# Patient Record
Sex: MALE | Race: OTHER | HISPANIC OR LATINO | URBAN - METROPOLITAN AREA
[De-identification: names, ages, dates, MRNs, and addresses within clinical notes are randomized per-mention and may not be internally consistent; named-entity substitution may affect disease eponyms.]

---

## 2020-03-09 ENCOUNTER — EMERGENCY (EMERGENCY)
Facility: HOSPITAL | Age: 35
LOS: 1 days | Discharge: HOME | End: 2020-03-09
Attending: EMERGENCY MEDICINE | Admitting: EMERGENCY MEDICINE
Payer: MEDICAID

## 2020-03-09 VITALS
SYSTOLIC BLOOD PRESSURE: 132 MMHG | TEMPERATURE: 100 F | OXYGEN SATURATION: 99 % | RESPIRATION RATE: 16 BRPM | WEIGHT: 199.96 LBS | DIASTOLIC BLOOD PRESSURE: 87 MMHG | HEIGHT: 66 IN | HEART RATE: 62 BPM

## 2020-03-09 VITALS
OXYGEN SATURATION: 98 % | HEART RATE: 64 BPM | DIASTOLIC BLOOD PRESSURE: 77 MMHG | RESPIRATION RATE: 16 BRPM | SYSTOLIC BLOOD PRESSURE: 128 MMHG | TEMPERATURE: 98 F

## 2020-03-09 PROCEDURE — 99283 EMERGENCY DEPT VISIT LOW MDM: CPT

## 2020-03-09 PROCEDURE — 99283 EMERGENCY DEPT VISIT LOW MDM: CPT | Mod: 25

## 2020-03-09 PROCEDURE — 90471 IMMUNIZATION ADMIN: CPT

## 2020-03-09 PROCEDURE — 90715 TDAP VACCINE 7 YRS/> IM: CPT

## 2020-03-09 RX ORDER — CIPROFLOXACIN HCL 0.3 %
2 DROPS OPHTHALMIC (EYE)
Qty: 1 | Refills: 0
Start: 2020-03-09 | End: 2020-03-15

## 2020-03-09 RX ORDER — FLUORESCEIN SODIUM 9 MG
1 STRIP OPHTHALMIC (EYE) ONCE
Refills: 0 | Status: COMPLETED | OUTPATIENT
Start: 2020-03-09 | End: 2020-03-09

## 2020-03-09 RX ORDER — TETANUS TOXOID, REDUCED DIPHTHERIA TOXOID AND ACELLULAR PERTUSSIS VACCINE, ADSORBED 5; 2.5; 8; 8; 2.5 [IU]/.5ML; [IU]/.5ML; UG/.5ML; UG/.5ML; UG/.5ML
0.5 SUSPENSION INTRAMUSCULAR ONCE
Refills: 0 | Status: COMPLETED | OUTPATIENT
Start: 2020-03-09 | End: 2020-03-09

## 2020-03-09 RX ADMIN — Medication 1 APPLICATION(S): at 10:41

## 2020-03-09 RX ADMIN — Medication 1 DROP(S): at 10:42

## 2020-03-09 RX ADMIN — TETANUS TOXOID, REDUCED DIPHTHERIA TOXOID AND ACELLULAR PERTUSSIS VACCINE, ADSORBED 0.5 MILLILITER(S): 5; 2.5; 8; 8; 2.5 SUSPENSION INTRAMUSCULAR at 12:24

## 2020-03-09 NOTE — ED PROVIDER NOTE - CLINICAL SUMMARY MEDICAL DECISION MAKING FREE TEXT BOX
33 yo M  with FB to right eye. Appreciable circular FB at 8:00 position. Partially removed with 18G needle and cotton swab. IOP pressure WNL (11, 15,16). Corneal abrasion presents. Cipro gtts provided and optho referrals made. Nontoxic and medically stable for discharge. Return precautions provided and patient understands to return to the ED for worsening signs and symptoms. Instructed to follow up with optho ASAP and agreeable. tetanus updated. Patient's questions answered.

## 2020-03-09 NOTE — ED PROVIDER NOTE - NSFOLLOWUPCLINICS_GEN_ALL_ED_FT
[facility] - Ophthalmology Clinic  Ophthalmology  [address], [address] [place] [address]  Phone: [phone]  Fax:   Follow Up Time:     [facility]  Ophthalmology  [address] [place] [address]  Phone: [phone]  Fax:   Follow Up Time:     [facility] - Ophthalmology  Ophthalmology  [address], [address]  [place] [address]  Phone: [phone]  Fax:   Follow Up Time:     [facility]  Ophthalmology  [address], [address]  [place] [address]  Phone: [phone]  Fax:   Follow Up Time:     [facility]  Ophthalmolgy  [address], [address]  [place] [address]  Phone: [phone]  Fax:   Follow Up Time:

## 2020-03-09 NOTE — ED PROVIDER NOTE - CONSTITUTIONAL, MLM
normal... Well appearing, awake, alert, oriented to person, place, time/situation and in no apparent distress. wake, alert, oriented to person, place, time/situation and in no apparent distress.

## 2020-03-09 NOTE — ED PROVIDER NOTE - BILATERAL EYES
circular black foreign body at 8'o clock position in iris. conjunctiva mildly injected. extraocular movement intact. pupils reactive. circular small black foreign body at 8'o clock position in iris. conjunctiva mildly injected. extraocular movement intact. pupils reactive.

## 2020-03-09 NOTE — ED PROVIDER NOTE - PATIENT PORTAL LINK FT
You can access the FollowMyHealth Patient Portal offered by [facility] by registering at the following website: [email or web address]. By joining [employer]’s FollowMyHealth portal, you will also be able to view your health information using other applications (apps) compatible with our system.

## 2020-03-09 NOTE — ED PROVIDER NOTE - OBJECTIVE STATEMENT
35 y/o  M who works at [employer] presents to ED complaining of foreign eye body. Pt states 3d ago, he was working on a ceiling and felt some particles go into his right eye. Pt adds there is increased irritation and he feels like something in there so he is rubbing his eye a lot. Pt states there is pain with extraocular movements. Pt denies using protective goggles, glasses, or contacts. Pt denies visual changes, dizziness or any other complaints. NKDA. 33 y/o  M who works at [employer] presents to ED complaining of foreign eye body. Pt states 3d ago, he was working on a ceiling and felt some particles go into his right eye. Pt adds there is increased irritation and he feels like something in there so he is rubbing his eye a lot. Pt states there is no pain with extraocular movements. Pt denies using protective goggles, glasses, or contacts. Pt denies visual changes, dizziness or any other complaints. NKDA.

## 2020-03-09 NOTE — ED PROVIDER NOTE - NSFOLLOWUPINSTRUCTIONS_ED_ALL_ED_FT
You were seen today for your eye pain. You were found to have a foreign body in your eye. Please follow up at the eye clinic and use your eye drops as prescribed. Please return to the Emergency Department for worsening signs or symptoms.

## 2020-03-09 NOTE — ED ADULT NURSE NOTE - NSIMPLEMENTINTERV_GEN_ALL_ED
Implemented All Universal Safety Interventions:  [place] to call system. Call bell, personal items and telephone within reach. Instruct patient to call for assistance. Room bathroom lighting operational. Non-slip footwear when patient is off stretcher. Physically safe environment: no spills, clutter or unnecessary equipment. Stretcher in lowest position, wheels locked, appropriate side rails in place.

## 2020-03-09 NOTE — ED PROVIDER NOTE - CARE PLAN
Principal Discharge DX:	Foreign body of right eye, initial encounter Principal Discharge DX:	Foreign body of right eye, initial encounter  Secondary Diagnosis:	Corneal abrasion

## 2020-03-09 NOTE — ED PROVIDER NOTE - CPE EDP GASTRO NORM
Telephone Encounter by Cristin Pritchard at 01/18/18 11:26 AM     Author:  Cristin Pritchard Service:  (none) Author Type:  Patient      Filed:  01/18/18 11:26 AM Encounter Date:  1/5/2018 Status:  Signed     :  Cristin Pritchard (Patient )            I have attempted to contact patient[KL1.1T]. Phone was answered, but pt could not hear me.  Will try again at a later time.[KL1.1M]        Revision History        User Key Date/Time User Provider Type Action    > KL1.1 01/18/18 11:26 AM Cristin Pritchard Patient  Sign    M - Manual, T - Template             normal...

## 2022-07-13 NOTE — ED ADULT NURSE NOTE - BREATH SOUNDS, MLM
Render Note In Bullet Format When Appropriate: No [facility] Consent: The patient's consent was obtained including but not limited to risks of crusting, scabbing, blistering, scarring, darker or lighter pigmentary change, recurrence, incomplete removal and infection. Number Of Freeze-Thaw Cycles: 2 freeze-thaw cycles Clear Duration Of Freeze Thaw-Cycle (Seconds): 4 Detail Level: Detailed Post-Care Instructions: I reviewed with the patient in detail post-care instructions. Patient is to wear sunprotection, and avoid picking at any of the treated lesions. Pt may apply Vaseline to crusted or scabbing areas.